# Patient Record
Sex: MALE | Race: BLACK OR AFRICAN AMERICAN | NOT HISPANIC OR LATINO | Employment: OTHER | URBAN - METROPOLITAN AREA
[De-identification: names, ages, dates, MRNs, and addresses within clinical notes are randomized per-mention and may not be internally consistent; named-entity substitution may affect disease eponyms.]

---

## 2022-06-09 ENCOUNTER — TELEPHONE (OUTPATIENT)
Dept: PODIATRY | Facility: CLINIC | Age: 67
End: 2022-06-09
Payer: MEDICAID

## 2022-06-09 PROBLEM — B35.3 TINEA PEDIS: Status: ACTIVE | Noted: 2022-06-09

## 2022-06-09 NOTE — TELEPHONE ENCOUNTER
----- Message from Neena Guallpa sent at 6/9/2022  8:36 AM CDT -----  Regarding: appt  Contact: Nikia @ [phone]  Caller asking to speak with someone regarding scheduling a Podiatry appt at the [facility] location, no available appts in epic. Please call.

## 2022-10-13 ENCOUNTER — OFFICE VISIT (OUTPATIENT)
Dept: PODIATRY | Facility: CLINIC | Age: 67
End: 2022-10-13
Payer: MEDICARE

## 2022-10-13 VITALS
WEIGHT: 148 LBS | BODY MASS INDEX: 22.5 KG/M2 | HEART RATE: 118 BPM | SYSTOLIC BLOOD PRESSURE: 105 MMHG | DIASTOLIC BLOOD PRESSURE: 63 MMHG

## 2022-10-13 DIAGNOSIS — B35.3 TINEA PEDIS OF LEFT FOOT: Primary | ICD-10-CM

## 2022-10-13 DIAGNOSIS — L84 HELOMA MOLLE: ICD-10-CM

## 2022-10-13 PROCEDURE — 99203 OFFICE O/P NEW LOW 30 MIN: CPT | Mod: S$GLB,,, | Performed by: PODIATRIST

## 2022-10-13 PROCEDURE — 1126F AMNT PAIN NOTED NONE PRSNT: CPT | Mod: CPTII,S$GLB,, | Performed by: PODIATRIST

## 2022-10-13 PROCEDURE — 1159F MED LIST DOCD IN RCRD: CPT | Mod: CPTII,S$GLB,, | Performed by: PODIATRIST

## 2022-10-13 PROCEDURE — 1159F PR MEDICATION LIST DOCUMENTED IN MEDICAL RECORD: ICD-10-PCS | Mod: CPTII,S$GLB,, | Performed by: PODIATRIST

## 2022-10-13 PROCEDURE — 99999 PR PBB SHADOW E&M-EST. PATIENT-LVL III: CPT | Mod: PBBFAC,,, | Performed by: PODIATRIST

## 2022-10-13 PROCEDURE — 3078F PR MOST RECENT DIASTOLIC BLOOD PRESSURE < 80 MM HG: ICD-10-PCS | Mod: CPTII,S$GLB,, | Performed by: PODIATRIST

## 2022-10-13 PROCEDURE — 3074F PR MOST RECENT SYSTOLIC BLOOD PRESSURE < 130 MM HG: ICD-10-PCS | Mod: CPTII,S$GLB,, | Performed by: PODIATRIST

## 2022-10-13 PROCEDURE — 99203 PR OFFICE/OUTPT VISIT, NEW, LEVL III, 30-44 MIN: ICD-10-PCS | Mod: S$GLB,,, | Performed by: PODIATRIST

## 2022-10-13 PROCEDURE — 99999 PR PBB SHADOW E&M-EST. PATIENT-LVL III: ICD-10-PCS | Mod: PBBFAC,,, | Performed by: PODIATRIST

## 2022-10-13 PROCEDURE — 1126F PR PAIN SEVERITY QUANTIFIED, NO PAIN PRESENT: ICD-10-PCS | Mod: CPTII,S$GLB,, | Performed by: PODIATRIST

## 2022-10-13 PROCEDURE — 3074F SYST BP LT 130 MM HG: CPT | Mod: CPTII,S$GLB,, | Performed by: PODIATRIST

## 2022-10-13 PROCEDURE — 3078F DIAST BP <80 MM HG: CPT | Mod: CPTII,S$GLB,, | Performed by: PODIATRIST

## 2022-10-13 NOTE — PROGRESS NOTES
Subjective:      Patient ID: Stanton Ignacio is a 67 y.o. male.    Chief Complaint: Foot Problem (L fungust)    Stanton is a 67 y.o. male who presents new to the podiatry clinic  with complaint of  left foot pain w/ cracking between 4th & 5th toe L. Onset of the symptoms was several months ago. Precipitating event:  tends to be recurrent in Summer  . Symptoms have waxed and waned.  Evaluation to date:  ED a few months ago & put on Lamisil cream for 3-4 wks. Previously also had dead skin trimmed out.    Past Medical History:   Diagnosis Date    Hypertension       Patient Active Problem List   Diagnosis    Tinea pedis    Adenocarcinoma of lung, stage 1, right    Colon polyps    Chronic obstructive pulmonary disease (COPD)    History of malignant carcinoid tumor of rectum    Internal hemorrhoids    S/P hernia repair    Tobacco abuse   2018 lung cancer - ongoing treatment @ [facility] including Xray, PET scan.    PCP: Natalie Whitt NP     Objective:      Review of Systems   Constitutional: Negative for malaise/fatigue.   Cardiovascular:  Negative for claudication and leg swelling.   Skin:  Positive for color change, dry skin, poor wound healing and suspicious lesions. Negative for itching, nail changes and rash.   Musculoskeletal:  Negative for arthritis, back pain, falls, joint pain, myalgias and neck pain.   Neurological:  Negative for focal weakness, numbness, paresthesias and sensory change.   Psychiatric/Behavioral:  The patient is not nervous/anxious.    Physical Exam  Vitals reviewed.   Constitutional:       General: He is not in acute distress.     Appearance: He is well-developed and normal weight.   Cardiovascular:      Pulses:           Dorsalis pedis pulses are 2+ on the left side.   Musculoskeletal:         General: No swelling, tenderness or signs of injury.      Right lower leg: No edema.      Left lower leg: No edema.        Feet:    Feet:      Left foot:      Skin integrity: Skin breakdown, callus, dry skin and  fissure present. No ulcer, erythema or warmth.   Skin:     General: Skin is warm and dry.      Capillary Refill: Capillary refill takes less than 2 seconds.      Findings: Lesion and rash present. No bruising or erythema. Rash is not pustular or urticarial.      Nails: There is no clubbing.      Comments: Hyperkeratosis proximal web space 4th L w/ mild meceration; no ulceration.[er   Neurological:      Mental Status: He is alert and oriented to person, place, and time.      Sensory: No sensory deficit.      Motor: No weakness.      Gait: Gait normal.   Psychiatric:         Mood and Affect: Mood and affect normal.         Behavior: Behavior normal. Behavior is cooperative.         Assessment:      Encounter Diagnoses   Name Primary?    Tinea pedis of left foot Yes    Heloma molle        Problem List Items Addressed This Visit          Derm    Tinea pedis - Primary     Other Visit Diagnoses       Heloma molle               Plan:       Stanton was seen today for foot problem.    Diagnoses and all orders for this visit:    Tinea pedis of left foot    Heloma molle    I counseled the patient on his conditions, their implications and medical management.    - Shoe inspection. Patient instructed on proper foot hygeine.     - With patient's permission, hyperkeratosis was aggressively reduced and debrided, removing all offending debris. Patient relates relief following the procedure.  Options re: toe separators dispensed including gel tubesleeve & gel toe separator pads.    Gentian violet applied to intertriginous space. Patient may continue to do so prn @ least weekly or continue use of other topical antifungals OTC.    F/U prn.

## 2022-10-23 PROBLEM — Z72.0 TOBACCO ABUSE: Status: ACTIVE | Noted: 2018-08-09

## 2022-10-23 PROBLEM — Z98.890 S/P HERNIA REPAIR: Status: ACTIVE | Noted: 2018-06-18

## 2022-10-23 PROBLEM — Z87.19 S/P HERNIA REPAIR: Status: ACTIVE | Noted: 2018-06-18

## 2022-10-23 PROBLEM — K64.8 INTERNAL HEMORRHOIDS: Status: ACTIVE | Noted: 2019-03-14

## 2022-10-23 PROBLEM — Z85.040: Status: ACTIVE | Noted: 2019-08-02

## 2022-10-23 PROBLEM — C34.91 ADENOCARCINOMA OF LUNG, STAGE 1, RIGHT: Status: ACTIVE | Noted: 2018-05-16

## 2022-10-23 PROBLEM — K63.5 COLON POLYPS: Status: ACTIVE | Noted: 2019-03-14

## 2022-10-23 PROBLEM — J44.9 CHRONIC OBSTRUCTIVE PULMONARY DISEASE (COPD): Status: ACTIVE | Noted: 2020-09-22

## 2022-12-03 DIAGNOSIS — U07.1 COVID-19 VIRUS DETECTED: ICD-10-CM
